# Patient Record
Sex: FEMALE | URBAN - METROPOLITAN AREA
[De-identification: names, ages, dates, MRNs, and addresses within clinical notes are randomized per-mention and may not be internally consistent; named-entity substitution may affect disease eponyms.]

---

## 2024-06-06 ENCOUNTER — TELEPHONE (OUTPATIENT)
Age: 24
End: 2024-06-06

## 2024-06-06 NOTE — TELEPHONE ENCOUNTER
Someone from [facility] called with patient on the line asking to make an apt for a breast reduction.    Started to create the chart then patient lost connection or not sure what happened.    Ended the call with the  and if she calls back please continue taking missing info to complete her chart.